# Patient Record
Sex: MALE | URBAN - METROPOLITAN AREA
[De-identification: names, ages, dates, MRNs, and addresses within clinical notes are randomized per-mention and may not be internally consistent; named-entity substitution may affect disease eponyms.]

---

## 2019-08-22 ENCOUNTER — OFFICE VISIT (OUTPATIENT)
Dept: URBAN - METROPOLITAN AREA CLINIC 48 | Facility: CLINIC | Age: 49
End: 2019-08-22
Payer: COMMERCIAL

## 2019-08-22 DIAGNOSIS — H11.31 CONJUNCTIVAL HEMORRHAGE, RIGHT EYE: Primary | ICD-10-CM

## 2019-08-22 PROCEDURE — 92004 COMPRE OPH EXAM NEW PT 1/>: CPT | Performed by: OPTOMETRIST

## 2019-08-22 ASSESSMENT — INTRAOCULAR PRESSURE
OD: 16
OS: 21

## 2019-08-22 NOTE — IMPRESSION/PLAN
Impression: Conjunctival hemorrhage, right eye: H11.31.

-Small, resolving subconj heme - pt sates has multiple per year Plan: Recommend continued fu care w/ PCP - primary causes of spontaneous (non-traumatic) subconj heme include hypertension, diabetes, blood coagulation problems/disease, anemia etc. Pt states recent blood work seemed normal, but is moving toward treatment for sleep apnea. Recommend follow through w/ treating to prevent heart disease, low-tension glaucoma, etc. 

RTC PRN/1 year for annual checkup.